# Patient Record
Sex: FEMALE | Race: WHITE | NOT HISPANIC OR LATINO | Employment: UNEMPLOYED | URBAN - METROPOLITAN AREA
[De-identification: names, ages, dates, MRNs, and addresses within clinical notes are randomized per-mention and may not be internally consistent; named-entity substitution may affect disease eponyms.]

---

## 2019-06-13 ENCOUNTER — OFFICE VISIT (OUTPATIENT)
Dept: INFECTIOUS DISEASES | Facility: CLINIC | Age: 34
End: 2019-06-13

## 2019-06-13 ENCOUNTER — CLINICAL SUPPORT (OUTPATIENT)
Dept: INFECTIOUS DISEASES | Facility: CLINIC | Age: 34
End: 2019-06-13

## 2019-06-13 ENCOUNTER — TELEPHONE (OUTPATIENT)
Dept: INFECTIOUS DISEASES | Facility: CLINIC | Age: 34
End: 2019-06-13

## 2019-06-13 VITALS — HEART RATE: 73 BPM | TEMPERATURE: 98 F | WEIGHT: 126.13 LBS

## 2019-06-13 DIAGNOSIS — Z78.9 HISTORY OF FOREIGN TRAVEL: ICD-10-CM

## 2019-06-13 DIAGNOSIS — Z71.84 COUNSELING ABOUT TRAVEL: Primary | ICD-10-CM

## 2019-06-13 DIAGNOSIS — Z71.85 IMMUNIZATION COUNSELING: ICD-10-CM

## 2019-06-13 DIAGNOSIS — Z71.85 VACCINE COUNSELING: ICD-10-CM

## 2019-06-13 PROCEDURE — 99999 PR PBB SHADOW E&M-EST. PATIENT-LVL III: ICD-10-PCS | Mod: PBBFAC,,, | Performed by: INTERNAL MEDICINE

## 2019-06-13 PROCEDURE — 99402 PR PREVENT COUNSEL,INDIV,30 MIN: ICD-10-PCS | Mod: S$GLB,,, | Performed by: INTERNAL MEDICINE

## 2019-06-13 PROCEDURE — 90632 HEPA VACCINE ADULT IM: CPT | Mod: S$GLB,,, | Performed by: INTERNAL MEDICINE

## 2019-06-13 PROCEDURE — 90632 HEPATITIS A VACCINE ADULT IM: ICD-10-PCS | Mod: S$GLB,,, | Performed by: INTERNAL MEDICINE

## 2019-06-13 PROCEDURE — 90472 YELLOW FEVER VACCINE SQ: ICD-10-PCS | Mod: S$GLB,,, | Performed by: INTERNAL MEDICINE

## 2019-06-13 PROCEDURE — 90471 HEPATITIS A VACCINE ADULT IM: ICD-10-PCS | Mod: S$GLB,,, | Performed by: INTERNAL MEDICINE

## 2019-06-13 PROCEDURE — 90691 TYPHOID VACCINE IM: CPT | Mod: S$GLB,,, | Performed by: INTERNAL MEDICINE

## 2019-06-13 PROCEDURE — 99402 PREV MED CNSL INDIV APPRX 30: CPT | Mod: S$GLB,,, | Performed by: INTERNAL MEDICINE

## 2019-06-13 PROCEDURE — 99999 PR PBB SHADOW E&M-EST. PATIENT-LVL III: CPT | Mod: PBBFAC,,, | Performed by: INTERNAL MEDICINE

## 2019-06-13 PROCEDURE — 90472 IMMUNIZATION ADMIN EACH ADD: CPT | Mod: S$GLB,,, | Performed by: INTERNAL MEDICINE

## 2019-06-13 PROCEDURE — 90691 TYPHOID VICPS VACCINE IM: ICD-10-PCS | Mod: S$GLB,,, | Performed by: INTERNAL MEDICINE

## 2019-06-13 PROCEDURE — 90471 IMMUNIZATION ADMIN: CPT | Mod: S$GLB,,, | Performed by: INTERNAL MEDICINE

## 2019-06-13 PROCEDURE — 90717 YELLOW FEVER VACCINE SUBQ: CPT | Mod: S$GLB,,, | Performed by: INTERNAL MEDICINE

## 2019-06-13 PROCEDURE — 90717 YELLOW FEVER VACCINE SQ: ICD-10-PCS | Mod: S$GLB,,, | Performed by: INTERNAL MEDICINE

## 2019-06-13 RX ORDER — AZITHROMYCIN 500 MG/1
TABLET, FILM COATED ORAL
Qty: 2 TABLET | Refills: 0 | Status: SHIPPED | OUTPATIENT
Start: 2019-06-13 | End: 2019-06-25

## 2019-06-13 RX ORDER — ATOVAQUONE AND PROGUANIL HYDROCHLORIDE 250; 100 MG/1; MG/1
TABLET, FILM COATED ORAL
Qty: 17 TABLET | Refills: 0 | Status: SHIPPED | OUTPATIENT
Start: 2019-06-13 | End: 2019-06-25

## 2019-06-13 RX ORDER — ONDANSETRON 4 MG/1
4 TABLET, FILM COATED ORAL EVERY 8 HOURS PRN
Qty: 30 TABLET | Refills: 0 | Status: SHIPPED | OUTPATIENT
Start: 2019-06-13 | End: 2019-06-25

## 2019-06-13 NOTE — PROGRESS NOTES
INFECTIOUS DISEASE TRAVEL CLINIC  06/13/2019 3:37 PM    Subjective:      Chief Complaint:   Chief Complaint   Patient presents with    Travel Consult       History of Present Illness    Patient Belkys Funk is a 33 y.o. female who presents today for routine pretravel consultation.  The patient denies past medical history.   The patient will be traveling to [place] on 6/27. The purpose of this trip is [employer] trip. The patient will be at this destination for 1 day in [place] and 8 days in [place] (returning on 7/6/19).  The patient will be lodging at hotels, but will be on her own for meals.  Interested in yellow fever vaccine.     The patient has travelled to the following other countries in the past; cruize to [place] (got off boat once in [facility] [place]).  The patient reports that they are up to date all their childhood vaccinations.  The patient reports receipt of the following travel related vaccinations;      Have you ever received:  YES NO DATES  Routine Childhood vaccines  [x]         []       Influenza vaccine   []         [x]       Tetanus-diptheria -pertussis  [x]         [] 1/7/2016   Hepatitis A vaccine series       []         []     Hepatitis B vaccine series         [x]         [] 4/2001, 7/2001, 7/2002    Typhoid vaccine   []         [x]   Yellow fever vaccine   []         [x]   Rabies vaccine   []         [x]     PMHx: none  Med: none  Allergies: denies    Review of Symptoms:  Constitutional: Denies fevers, chills, or weakness.  Cardiovascular: Denies chest pain, palpitations  Respiratory: Denies shortness of breath, cough, hemoptysis  GI: Denies nausea/vomitting,, abd pain  : Denies dysuria  Musculoskeletal: Denies joint pain or myalgias.  Skin/breast: Denies rashes, lumps, lesions, or discharge.  Neurologic: Denies headache     History reviewed. No pertinent past medical history.    History reviewed. No pertinent surgical history.    History reviewed. No pertinent family history.    Social History      Socioeconomic History    Marital status:      Spouse name: Not on file    Number of children: Not on file    Years of education: Not on file    Highest education level: Not on file   Occupational History    Not on file   Social Needs    Financial resource strain: Not on file    Food insecurity:     Worry: Not on file     Inability: Not on file    Transportation needs:     Medical: Not on file     Non-medical: Not on file   Tobacco Use    Smoking status: Never Smoker    Smokeless tobacco: Never Used   Substance and Sexual Activity    Alcohol use: Not Currently    Drug use: Not on file    Sexual activity: Not on file   Lifestyle    Physical activity:     Days per week: Not on file     Minutes per session: Not on file    Stress: Not on file   Relationships    Social connections:     Talks on phone: Not on file     Gets together: Not on file     Attends [employer] service: Not on file     Active member of club or organization: Not on file     Attends meetings of clubs or organizations: Not on file     Relationship status: Not on file   Other Topics Concern    Not on file   Social History Narrative    Not on file       Review of patient's allergies indicates:  No Known Allergies      Objective:   VS (24h):   Vitals:    06/13/19 1525   Pulse: 73   Temp: 98.4 °F (36.9 °C)     General: Afebrile, alert, comfortable, no acute distress.   HEENT: DINA. EOMI, no scleral icterus.   Pulmonary: Non labored  Extremities: Moves all extremities x 4.   Skin: No jaundice, rashes, or visible lesions.   Neurological:  Alert and oriented x 4.     No results found for: GLU  No results found for: CALCIUM  No results found for: ALBUMIN  No results found for: PROT  No results found for: NA  No results found for: K  No results found for: CO2  No results found for: CL  No results found for: BUN  No results found for: CREATININE  No results found for: ALKPHOS  No results found for: ALT  No results found for: AST  No  results found for: BILITOT  No results found for: WBC  No results found for: HGB  No results found for: HCT  No results found for: MCV  No results found for: PLT  No results found for: CHOL  No results found for: HDL  No results found for: LDLCALC  No results found for: TRIG  No results found for: CHOLHDL    HIV: No components found for: HIV 1/2 AG/AB  Hepatitis C IgG: No components found for: HEPATITIS C  Syphilis: No results found for: RPR    Hepatitis A IgG: No components found for: HEPATITIS A IGG  Hepatitis Bc IgG: No components found for: HEPATITIS B CORE IGG  Hepatitis Bs IgG:  Quantiferon: No results found for: QUANTIFERON        Assessment:     Pre-Travel clinic assessment  Vaccine counseling    Plan:     Patient specific risks:      The patient was provided with an extensive travel guidance packet which provides travel information specific to the patients itinerary.     The patient's medical history was reviewed and the patient was counseled on:    Precautions against development of DVT during flight.    Registering with the state department and travel insurance was recommended in case of emergency.     Destination specific risks:      -Infectious Disease risks:      Mosquito Borne pathogens:  Reviewed basic mosquito avoidance precautions including wearing long sleeve clothing and insect repellant.  to prevent insect-borne diseases (e.g., malaria, dengue).The patient was also instructed to purchase insect repellent containing DEET (25-35%; higher strengths last longer, but >35% will damage synthetic materials) and apply premethrin spray on clothing according to repellent label instructions.    An anti-malarial agent was prescribed for malaria prophylaxis and possible side effects were reviewed. Atovaquone-proguanil 250-100 mg PO qdaily, start 2 days before expected exposure and end 7 days after last day of exposure.    Food Borne pathogens:  Reviewed basic hand, food and water sanitation precautions.   Patient instructed to take hand  on their trip. The patient was instructed to purchase Imodium over the counter to take in case diarrhea (without blood or fever) develops. Azithromycin was ordered for treatment if severe or bloody diarrhea develops and the patient was instructed on use and possible side effects.     STDs:  Risk of STDs reviewed with the patient. Discussed Personal protective measures     Rabies: Discussed the risk of rabies and precautions to prevent exposure. Patient is aware to seek prompt medical care should they be exposed.       -Environmental risks:     Personal and travel safety. Precautions to minimize risk/exposure to crime and motor vehicle accidents were reviewed with the patient.    Precautions against sun exposure.    -Vaccinations:  The patient's immunization history was reviewed and, based on the patient's itinerary, the following immunizations were ordered:  - Influenza vaccine (pt to get at local pharmacy)  - Hepatitis A 0, 6 months (first dose here, second dose written on rx to get in 6 months in [place])  - Typhoid IM x1  - Yellow fever vaccine    The patient was encouraged to contact us about any problems that may develop after immunization and possible side effects were reviewed.    Because of the nationwide shortage of Yellow Fever vaccine, the patient was offered Stamaril vaccine through the research protocol. Inclusion and exclusion criteria were reviewed with the patient and the form completed. Risks and benefits were discussed.   The consent was reviewed by me in detail with the patient and all questions were answered. The patient agrees to participate, and signed the consent. The patient was advised to return and or report any significant side effects related to the vaccine.    The patient was instructed to contact us if problems develop after travel.    > 30 min spent with patient and >50% of time spent counseling about travel    Elaine Gonzalez MD, MPH  Infectious  Disease

## 2019-06-13 NOTE — TELEPHONE ENCOUNTER
----- Message from Moraima Trejo sent at 6/13/2019  9:03 AM CDT -----  Contact: Belkys  tel:  [phone]   Pt says she wants to come in earlier today, instead of at 2pm.  Leaves for  Her trip on 6/27th.   Pls call .     --6/13/19 @ 0933, called patient = no answer left message informing patient that 2pm is the earliest appt we have today / call back to discuss if needed.

## 2019-06-13 NOTE — PROGRESS NOTES
Ms. Funk received Yellow Fever, Hepatitis A and Typhoid vaccine   Tolerated well  Left unit in NAD  Yellow Card given to patient

## 2019-06-25 ENCOUNTER — OFFICE VISIT (OUTPATIENT)
Dept: URGENT CARE | Facility: CLINIC | Age: 34
End: 2019-06-25
Payer: COMMERCIAL

## 2019-06-25 VITALS
RESPIRATION RATE: 16 BRPM | HEIGHT: 69 IN | SYSTOLIC BLOOD PRESSURE: 123 MMHG | HEART RATE: 74 BPM | OXYGEN SATURATION: 99 % | DIASTOLIC BLOOD PRESSURE: 69 MMHG | TEMPERATURE: 99 F | BODY MASS INDEX: 19.11 KG/M2 | WEIGHT: 129 LBS

## 2019-06-25 DIAGNOSIS — N39.0 URINARY TRACT INFECTION WITH HEMATURIA, SITE UNSPECIFIED: Primary | ICD-10-CM

## 2019-06-25 DIAGNOSIS — R31.9 URINARY TRACT INFECTION WITH HEMATURIA, SITE UNSPECIFIED: Primary | ICD-10-CM

## 2019-06-25 DIAGNOSIS — R10.9 ABDOMINAL PAIN, UNSPECIFIED ABDOMINAL LOCATION: ICD-10-CM

## 2019-06-25 LAB
B-HCG UR QL: NEGATIVE
BILIRUB UR QL STRIP: NEGATIVE
CTP QC/QA: YES
GLUCOSE UR QL STRIP: NEGATIVE
KETONES UR QL STRIP: NEGATIVE
LEUKOCYTE ESTERASE UR QL STRIP: POSITIVE
PH, POC UA: 7 (ref 5–8)
POC BLOOD, URINE: POSITIVE
POC NITRATES, URINE: NEGATIVE
PROT UR QL STRIP: NEGATIVE
SP GR UR STRIP: 1.01 (ref 1–1.03)
UROBILINOGEN UR STRIP-ACNC: POSITIVE (ref 0.1–1.1)

## 2019-06-25 PROCEDURE — 3008F BODY MASS INDEX DOCD: CPT | Mod: CPTII,S$GLB,, | Performed by: PHYSICIAN ASSISTANT

## 2019-06-25 PROCEDURE — 99203 PR OFFICE/OUTPT VISIT, NEW, LEVL III, 30-44 MIN: ICD-10-PCS | Mod: S$GLB,,, | Performed by: PHYSICIAN ASSISTANT

## 2019-06-25 PROCEDURE — 81003 POCT URINALYSIS, DIPSTICK, AUTOMATED, W/O SCOPE: ICD-10-PCS | Mod: QW,S$GLB,, | Performed by: PHYSICIAN ASSISTANT

## 2019-06-25 PROCEDURE — 81003 URINALYSIS AUTO W/O SCOPE: CPT | Mod: QW,S$GLB,, | Performed by: PHYSICIAN ASSISTANT

## 2019-06-25 PROCEDURE — 81025 POCT URINE PREGNANCY: ICD-10-PCS | Mod: S$GLB,,, | Performed by: PHYSICIAN ASSISTANT

## 2019-06-25 PROCEDURE — 81025 URINE PREGNANCY TEST: CPT | Mod: S$GLB,,, | Performed by: PHYSICIAN ASSISTANT

## 2019-06-25 PROCEDURE — 3008F PR BODY MASS INDEX (BMI) DOCUMENTED: ICD-10-PCS | Mod: CPTII,S$GLB,, | Performed by: PHYSICIAN ASSISTANT

## 2019-06-25 PROCEDURE — 99203 OFFICE O/P NEW LOW 30 MIN: CPT | Mod: S$GLB,,, | Performed by: PHYSICIAN ASSISTANT

## 2019-06-25 RX ORDER — SULFAMETHOXAZOLE AND TRIMETHOPRIM 800; 160 MG/1; MG/1
1 TABLET ORAL 2 TIMES DAILY
Qty: 14 TABLET | Refills: 0 | Status: SHIPPED | OUTPATIENT
Start: 2019-06-25 | End: 2019-07-02

## 2019-06-25 RX ORDER — PHENAZOPYRIDINE HYDROCHLORIDE 200 MG/1
200 TABLET, FILM COATED ORAL 3 TIMES DAILY PRN
Qty: 21 TABLET | Refills: 0 | Status: SHIPPED | OUTPATIENT
Start: 2019-06-25 | End: 2020-06-24

## 2019-06-25 NOTE — PROGRESS NOTES
"Subjective:       Patient ID: Belkys Funk is a 33 y.o. female.    Vitals:  height is 5' 9" (1.753 m) and weight is 58.5 kg (129 lb). Her temperature is 98.6 °F (37 °C). Her blood pressure is 123/69 and her pulse is 74. Her respiration is 16 and oxygen saturation is 99%.     Chief Complaint: Dysuria    Dysuria    This is a new problem. The current episode started in the past 7 days. The problem occurs every urination. The quality of the pain is described as burning. The pain is at a severity of 5/10. There has been no fever. There is no history of pyelonephritis. Associated symptoms include frequency and hematuria. Pertinent negatives include no chills, nausea, urgency, vomiting or rash. She has tried nothing for the symptoms.       Constitution: Negative for chills and fever.   Neck: Negative for painful lymph nodes.   Gastrointestinal: Positive for abdominal pain and abdominal bloating. Negative for nausea and vomiting.   Genitourinary: Positive for dysuria, frequency and hematuria. Negative for urgency, urine decreased, history of kidney stones, painful menstruation, irregular menstruation, missed menses, heavy menstrual bleeding, ovarian cysts, genital trauma, vaginal pain, vaginal discharge, vaginal bleeding, vaginal odor, painful intercourse, genital sore, painful ejaculation and pelvic pain.   Musculoskeletal: Negative for back pain.   Skin: Negative for rash and lesion.   Hematologic/Lymphatic: Negative for swollen lymph nodes.       Objective:      Physical Exam   Constitutional: She is oriented to person, place, and time. Vital signs are normal. She appears well-developed and well-nourished.  Non-toxic appearance. She does not have a sickly appearance. She does not appear ill. No distress.   HENT:   Head: Normocephalic and atraumatic.   Right Ear: External ear normal.   Left Ear: External ear normal.   Nose: Nose normal. No nasal deformity. No epistaxis.   Mouth/Throat: Oropharynx is clear and moist and " mucous membranes are normal.   Eyes: Conjunctivae and lids are normal.   Neck: Trachea normal, normal range of motion, full passive range of motion without pain and phonation normal. Neck supple.   Cardiovascular: Normal rate, regular rhythm, normal heart sounds and normal pulses.   Pulmonary/Chest: Effort normal and breath sounds normal. No respiratory distress.   Abdominal: Soft. Normal appearance and bowel sounds are normal. She exhibits no distension, no abdominal bruit, no pulsatile midline mass and no mass. There is tenderness in the suprapubic area. There is no rigidity, no guarding, no CVA tenderness, no tenderness at McBurney's point and negative Orta's sign.   Musculoskeletal: Normal range of motion. She exhibits no edema.   Neurological: She is alert and oriented to person, place, and time. She has normal strength.   Skin: Skin is warm, dry and intact. She is not diaphoretic. No pallor.   Psychiatric: She has a normal mood and affect. Her speech is normal and behavior is normal. Judgment and thought content normal. Cognition and memory are normal.   Nursing note and vitals reviewed.        Office Visit on 06/25/2019   Component Date Value Ref Range Status    POC Blood, Urine 06/25/2019 Positive* Negative Final    10    POC Bilirubin, Urine 06/25/2019 Negative  Negative Final    POC Urobilinogen, Urine 06/25/2019 Positive  0.1 - 1.1 Final    1.0    POC Ketones, Urine 06/25/2019 Negative  Negative Final    POC Protein, Urine 06/25/2019 Negative  Negative Final    POC Nitrates, Urine 06/25/2019 Negative  Negative Final    POC Glucose, Urine 06/25/2019 Negative  Negative Final    pH, UA 06/25/2019 7.0  5 - 8 Final    POC Specific Gravity, Urine 06/25/2019 1.010  1.003 - 1.029 Final    POC Leukocytes, Urine 06/25/2019 Positive* Negative Final    75    POC Preg Test, Ur 06/25/2019 Negative  Negative Final     Acceptable 06/25/2019 Yes   Final       Assessment:       1. Urinary tract  infection with hematuria, site unspecified    2. Abdominal pain, unspecified abdominal location        Plan:         Urinary tract infection with hematuria, site unspecified  -     sulfamethoxazole-trimethoprim 800-160mg (BACTRIM DS) 800-160 mg Tab; Take 1 tablet by mouth 2 (two) times daily. for 7 days  Dispense: 14 tablet; Refill: 0  -     phenazopyridine (PYRIDIUM) 200 MG tablet; Take 1 tablet (200 mg total) by mouth 3 (three) times daily as needed for Pain.  Dispense: 21 tablet; Refill: 0    Abdominal pain, unspecified abdominal location  -     POCT Urinalysis, Dipstick, Automated, W/O Scope  -     POCT urine pregnancy      Patient Instructions   · Follow up with your primary care if symptoms do not improve, or you may return here at any time.  · If you were referred to a specialist, please follow up with that specialty.  · If you were prescribed antibiotics, please take them to completion.  · If you were prescribed a narcotic or any medication with sedative effects, do not drive or operate heavy equipment or machinery while taking these medications.  · You must understand that you have received treatment at an Urgent Care facility only, and that you may be released before all of your medical problems are known or treated. Urgent Care facilities are not equipped to handle life threatening emergencies. It is recommended that you seek care at an Emergency Department for further evaluation of worsening or concerning symptoms, or possibly life threatening conditions as discussed.                                        If you  smoke, please stop smoking      ABDOMINAL PAIN     Based on your visit today, the exact cause of your abdominal (stomach) pain is not certain. Signs of a serious problem may take more time to appear. Therefore, it is important for you to watch for any new symptoms or worsening of your condition as we discussed in the clinic, including appendicitis, kidney stones, ruptured ovarian cysts    HOME  "CARE:  1. Rest until your next exam. No strenuous activities.  2. Eat a diet low in fiber (called a low-residue diet). Foods allowed include refined breads, white rice, fruit and vegetable juices without pulp, tender meats. These foods will pass more easily through the intestine.  3. Avoid whole-grain foods, whole fruits and vegetables, meats, seeds and nuts, fried or fatty foods, dairy, alcohol and spicy foods until your symptoms go away.    FOLLOW UP with your doctor or this facility as instructed, or if your pain does not begin to improve in the next 24 hours.        GET PROMPT MEDICAL ATTENTION if any of the following occur:  Pain gets worse or moves to the right lower abdomen  New or worsening vomiting or diarrhea  Swelling of the abdomen  Unable to pass stool for more than three days  New fever over 100.0º F (37.8ºC), or rising fever  Blood in vomit or bowel movements (dark red or black color)  Jaundice (yellow color of eyes and skin)  Weakness, dizziness or fainting  Chest, arm, back, neck or jaw pain  Unexpected vaginal bleeding or missed period        Bladder Infection, Female (Adult)    Urine is normally doesn't have any bacteria in it. But bacteria can get into the urinary tract from the skin around the rectum. Or they can travel in the blood from elsewhere in the body. Once they are in your urinary tract, they can cause infection in the urethra (urethritis), the bladder (cystitis), or the kidneys (pyelonephritis).  The most common place for an infection is in the bladder. This is called a bladder infection. This is one of the most common infections in women. Most bladder infections are easily treated. They are not serious unless the infection spreads to the kidney.  The phrases "bladder infection," "UTI," and "cystitis" are often used to describe the same thing. But they are not always the same. Cystitis is an inflammation of the bladder. The most common cause of cystitis is an infection.  Symptoms  The " infection causes inflammation in the urethra and bladder. This causes many of the symptoms. The most common symptoms of a bladder infection are:  · Pain or burning when urinating  · Having to urinate more often than usual  · Urgent need to urinate  · Only a small amount of urine comes out  · Blood in urine  · Abdominal discomfort. This is usually in the lower abdomen above the pubic bone.  · Cloudy urine  · Strong- or bad-smelling urine  · Unable to urinate (urinary retention)  · Unable to hold urine in (urinary incontinence)  · Fever  · Loss of appetite  · Confusion (in older adults)  Causes  Bladder infections are not contagious. You can't get one from someone else, from a toilet seat, or from sharing a bath.  The most common cause of bladder infections is bacteria from the bowels. The bacteria get onto the skin around the opening of the urethra. From there, they can get into the urine and travel up to the bladder, causing inflammation and infection. This usually happens because of:  · Wiping improperly after urinating. Always wipe from front to back.  · Bowel incontinence  · Pregnancy  · Procedures such as having a catheter inserted  · Older age  · Not emptying your bladder. This can allow bacteria a chance to grow in your urine.  · Dehydration  · Constipation  · Sex  · Use of a diaphragm for birth control   Treatment  Bladder infections are diagnosed by a urine test. They are treated with antibiotics and usually clear up quickly without complications. Treatment helps prevent a more serious kidney infection.  Medicines  Medicines can help in the treatment of a bladder infection:  · Take antibiotics until they are used up, even if you feel better. It is important to finish them to make sure the infection has cleared.  · You can use acetaminophen or ibuprofen for pain, fever, or discomfort, unless another medicine was prescribed. If you have chronic liver or kidney disease, talk with your healthcare provider before  using these medicines. Also talk with your provider if you've ever had a stomach ulcer or gastrointestinal bleeding, or are taking blood-thinner medicines.  · If you are given phenazopydridine to reduce burning with urination, it will cause your urine to become a bright orange color. This can stain clothing.  Care and prevention  These self-care steps can help prevent future infections:  · Drink plenty of fluids to prevent dehydration and flush out your bladder. Do this unless you must restrict fluids for other health reasons, or your doctor told you not to.  · Proper cleaning after going to the bathroom is important. Wipe from front to back after using the toilet to prevent the spread of bacteria.  · Urinate more often. Don't try to hold urine in for a long time.  · Wear loose-fitting clothes and cotton underwear. Avoid tight-fitting pants.  · Improve your diet and prevent constipation. Eat more fresh fruit and vegetables, and fiber, and less junk and fatty foods.  · Avoid sex until your symptoms are gone.  · Avoid caffeine, alcohol, and spicy foods. These can irritate your bladder.  · Urinate right after intercourse to flush out your bladder.  · If you use birth control pills and have frequent bladder infections, discuss it with your doctor.  Follow-up care  Call your healthcare provider if all symptoms are not gone after 3 days of treatment. This is especially important if you have repeat infections.  If a culture was done, you will be told if your treatment needs to be changed. If directed, you can call to find out the results.  If X-rays were done, you will be told if the results will affect your treatment.  Call 911  Call 911 if any of the following occur:  · Trouble breathing  · Hard to wake up or confusion  · Fainting or loss of consciousness  · Rapid heart rate  When to seek medical advice  Call your healthcare provider right away if any of these occur:  · Fever of 100.4ºF (38.0ºC) or higher, or as directed  by your healthcare provider  · Symptoms are not better by the third day of treatment  · Back or belly (abdominal) pain that gets worse  · Repeated vomiting, or unable to keep medicine down  · Weakness or dizziness  · Vaginal discharge  · Pain, redness, or swelling in the outer vaginal area (labia)  Date Last Reviewed: 10/1/2016  © 6627-5297 [employer]. [address] [place] [address]. All rights reserved. This information is not intended as a substitute for professional medical care. Always follow your healthcare professional's instructions.

## 2019-06-25 NOTE — PATIENT INSTRUCTIONS
· Follow up with your primary care if symptoms do not improve, or you may return here at any time.  · If you were referred to a specialist, please follow up with that specialty.  · If you were prescribed antibiotics, please take them to completion.  · If you were prescribed a narcotic or any medication with sedative effects, do not drive or operate heavy equipment or machinery while taking these medications.  · You must understand that you have received treatment at an Urgent Care facility only, and that you may be released before all of your medical problems are known or treated. Urgent Care facilities are not equipped to handle life threatening emergencies. It is recommended that you seek care at an Emergency Department for further evaluation of worsening or concerning symptoms, or possibly life threatening conditions as discussed.                                        If you  smoke, please stop smoking      ABDOMINAL PAIN     Based on your visit today, the exact cause of your abdominal (stomach) pain is not certain. Signs of a serious problem may take more time to appear. Therefore, it is important for you to watch for any new symptoms or worsening of your condition as we discussed in the clinic, including appendicitis, kidney stones, ruptured ovarian cysts    HOME CARE:  1. Rest until your next exam. No strenuous activities.  2. Eat a diet low in fiber (called a low-residue diet). Foods allowed include refined breads, white rice, fruit and vegetable juices without pulp, tender meats. These foods will pass more easily through the intestine.  3. Avoid whole-grain foods, whole fruits and vegetables, meats, seeds and nuts, fried or fatty foods, dairy, alcohol and spicy foods until your symptoms go away.    FOLLOW UP with your doctor or this facility as instructed, or if your pain does not begin to improve in the next 24 hours.        GET PROMPT MEDICAL ATTENTION if any of the following occur:  Pain gets worse or  "moves to the right lower abdomen  New or worsening vomiting or diarrhea  Swelling of the abdomen  Unable to pass stool for more than three days  New fever over 100.0º F (37.8ºC), or rising fever  Blood in vomit or bowel movements (dark red or black color)  Jaundice (yellow color of eyes and skin)  Weakness, dizziness or fainting  Chest, arm, back, neck or jaw pain  Unexpected vaginal bleeding or missed period        Bladder Infection, Female (Adult)    Urine is normally doesn't have any bacteria in it. But bacteria can get into the urinary tract from the skin around the rectum. Or they can travel in the blood from elsewhere in the body. Once they are in your urinary tract, they can cause infection in the urethra (urethritis), the bladder (cystitis), or the kidneys (pyelonephritis).  The most common place for an infection is in the bladder. This is called a bladder infection. This is one of the most common infections in women. Most bladder infections are easily treated. They are not serious unless the infection spreads to the kidney.  The phrases "bladder infection," "UTI," and "cystitis" are often used to describe the same thing. But they are not always the same. Cystitis is an inflammation of the bladder. The most common cause of cystitis is an infection.  Symptoms  The infection causes inflammation in the urethra and bladder. This causes many of the symptoms. The most common symptoms of a bladder infection are:  · Pain or burning when urinating  · Having to urinate more often than usual  · Urgent need to urinate  · Only a small amount of urine comes out  · Blood in urine  · Abdominal discomfort. This is usually in the lower abdomen above the pubic bone.  · Cloudy urine  · Strong- or bad-smelling urine  · Unable to urinate (urinary retention)  · Unable to hold urine in (urinary incontinence)  · Fever  · Loss of appetite  · Confusion (in older adults)  Causes  Bladder infections are not contagious. You can't get one " from someone else, from a toilet seat, or from sharing a bath.  The most common cause of bladder infections is bacteria from the bowels. The bacteria get onto the skin around the opening of the urethra. From there, they can get into the urine and travel up to the bladder, causing inflammation and infection. This usually happens because of:  · Wiping improperly after urinating. Always wipe from front to back.  · Bowel incontinence  · Pregnancy  · Procedures such as having a catheter inserted  · Older age  · Not emptying your bladder. This can allow bacteria a chance to grow in your urine.  · Dehydration  · Constipation  · Sex  · Use of a diaphragm for birth control   Treatment  Bladder infections are diagnosed by a urine test. They are treated with antibiotics and usually clear up quickly without complications. Treatment helps prevent a more serious kidney infection.  Medicines  Medicines can help in the treatment of a bladder infection:  · Take antibiotics until they are used up, even if you feel better. It is important to finish them to make sure the infection has cleared.  · You can use acetaminophen or ibuprofen for pain, fever, or discomfort, unless another medicine was prescribed. If you have chronic liver or kidney disease, talk with your healthcare provider before using these medicines. Also talk with your provider if you've ever had a stomach ulcer or gastrointestinal bleeding, or are taking blood-thinner medicines.  · If you are given phenazopydridine to reduce burning with urination, it will cause your urine to become a bright orange color. This can stain clothing.  Care and prevention  These self-care steps can help prevent future infections:  · Drink plenty of fluids to prevent dehydration and flush out your bladder. Do this unless you must restrict fluids for other health reasons, or your doctor told you not to.  · Proper cleaning after going to the bathroom is important. Wipe from front to back after  using the toilet to prevent the spread of bacteria.  · Urinate more often. Don't try to hold urine in for a long time.  · Wear loose-fitting clothes and cotton underwear. Avoid tight-fitting pants.  · Improve your diet and prevent constipation. Eat more fresh fruit and vegetables, and fiber, and less junk and fatty foods.  · Avoid sex until your symptoms are gone.  · Avoid caffeine, alcohol, and spicy foods. These can irritate your bladder.  · Urinate right after intercourse to flush out your bladder.  · If you use birth control pills and have frequent bladder infections, discuss it with your doctor.  Follow-up care  Call your healthcare provider if all symptoms are not gone after 3 days of treatment. This is especially important if you have repeat infections.  If a culture was done, you will be told if your treatment needs to be changed. If directed, you can call to find out the results.  If X-rays were done, you will be told if the results will affect your treatment.  Call 911  Call 911 if any of the following occur:  · Trouble breathing  · Hard to wake up or confusion  · Fainting or loss of consciousness  · Rapid heart rate  When to seek medical advice  Call your healthcare provider right away if any of these occur:  · Fever of 100.4ºF (38.0ºC) or higher, or as directed by your healthcare provider  · Symptoms are not better by the third day of treatment  · Back or belly (abdominal) pain that gets worse  · Repeated vomiting, or unable to keep medicine down  · Weakness or dizziness  · Vaginal discharge  · Pain, redness, or swelling in the outer vaginal area (labia)  Date Last Reviewed: 10/1/2016  © 4355-9359 [employer]. [address] [place] [address], [place] [address]. All rights reserved. This information is not intended as a substitute for professional medical care. Always follow your healthcare professional's instructions.

## 2019-06-28 ENCOUNTER — TELEPHONE (OUTPATIENT)
Dept: URGENT CARE | Facility: CLINIC | Age: 34
End: 2019-06-28